# Patient Record
Sex: MALE | Employment: OTHER | URBAN - METROPOLITAN AREA
[De-identification: names, ages, dates, MRNs, and addresses within clinical notes are randomized per-mention and may not be internally consistent; named-entity substitution may affect disease eponyms.]

---

## 2023-10-28 ENCOUNTER — HOSPITAL ENCOUNTER (OUTPATIENT)
Dept: RADIOLOGY | Facility: CLINIC | Age: 68
Discharge: HOME | End: 2023-10-28
Payer: MEDICARE

## 2023-10-28 ENCOUNTER — OFFICE VISIT (OUTPATIENT)
Dept: URGENT CARE | Facility: CLINIC | Age: 68
End: 2023-10-28
Payer: MEDICARE

## 2023-10-28 VITALS
DIASTOLIC BLOOD PRESSURE: 104 MMHG | HEART RATE: 92 BPM | TEMPERATURE: 99.1 F | RESPIRATION RATE: 18 BRPM | OXYGEN SATURATION: 92 % | SYSTOLIC BLOOD PRESSURE: 179 MMHG

## 2023-10-28 DIAGNOSIS — J45.21 MILD INTERMITTENT ASTHMA WITH ACUTE EXACERBATION (HHS-HCC): Primary | ICD-10-CM

## 2023-10-28 PROCEDURE — 99214 OFFICE O/P EST MOD 30 MIN: CPT | Performed by: PHYSICIAN ASSISTANT

## 2023-10-28 PROCEDURE — 71046 X-RAY EXAM CHEST 2 VIEWS: CPT

## 2023-10-28 PROCEDURE — 94640 AIRWAY INHALATION TREATMENT: CPT | Performed by: PHYSICIAN ASSISTANT

## 2023-10-28 RX ORDER — IPRATROPIUM BROMIDE AND ALBUTEROL SULFATE 2.5; .5 MG/3ML; MG/3ML
3 SOLUTION RESPIRATORY (INHALATION) ONCE
Status: COMPLETED | OUTPATIENT
Start: 2023-10-28 | End: 2023-10-28

## 2023-10-28 RX ORDER — ALBUTEROL SULFATE 90 UG/1
2 AEROSOL, METERED RESPIRATORY (INHALATION) EVERY 6 HOURS PRN
Qty: 18 G | Refills: 0 | Status: SHIPPED | OUTPATIENT
Start: 2023-10-28 | End: 2024-10-27

## 2023-10-28 RX ORDER — DOXYCYCLINE 100 MG/1
100 CAPSULE ORAL 2 TIMES DAILY
Qty: 20 CAPSULE | Refills: 0 | Status: SHIPPED | OUTPATIENT
Start: 2023-10-28 | End: 2023-11-07

## 2023-10-28 RX ORDER — PREDNISONE 10 MG/1
40 TABLET ORAL DAILY
Qty: 20 TABLET | Refills: 0 | Status: SHIPPED | OUTPATIENT
Start: 2023-10-28 | End: 2023-11-02

## 2023-10-28 RX ADMIN — IPRATROPIUM BROMIDE AND ALBUTEROL SULFATE 3 ML: 2.5; .5 SOLUTION RESPIRATORY (INHALATION) at 10:43

## 2023-10-28 ASSESSMENT — ENCOUNTER SYMPTOMS
ENDOCRINE NEGATIVE: 1
BACK PAIN: 0
ACTIVITY CHANGE: 0
SORE THROAT: 0
EYE DISCHARGE: 0
BLOOD IN STOOL: 0
PSYCHIATRIC NEGATIVE: 1
COLOR CHANGE: 0
HEMATOLOGIC/LYMPHATIC NEGATIVE: 1
ARTHRALGIAS: 0
NAUSEA: 0
RHINORRHEA: 1
ALLERGIC/IMMUNOLOGIC NEGATIVE: 1
FEVER: 0
DYSURIA: 0
EYE REDNESS: 0
ABDOMINAL PAIN: 0
APPETITE CHANGE: 0
SHORTNESS OF BREATH: 1
FLANK PAIN: 0
DIARRHEA: 0
EYE PAIN: 0
VOMITING: 0
COUGH: 1
WOUND: 0
FATIGUE: 0
SINUS PRESSURE: 1
NEUROLOGICAL NEGATIVE: 1
WHEEZING: 1

## 2023-10-28 NOTE — PATIENT INSTRUCTIONS
Mild intermittent asthma with acute exacerbation - Primary    Relevant Medications    predniSONE (Deltasone) tablet 60 mg    ipratropium-albuteroL (Duo-Neb) 0.5-2.5 mg/3 mL nebulizer solution 3 mL    Other Relevant Orders    XR chest 2 views      Narrative & Impression   Interpreted By:  Mady Pascal,   STUDY:  XR CHEST 2 VIEWS;  10/28/2023 10:35 am      INDICATION:  Signs/Symptoms:asthma flare.      COMPARISON:  None.      ACCESSION NUMBER(S):  [id number]      ORDERING CLINICIAN:  OPAL BRISCOE      FINDINGS:                  CARDIOMEDIASTINAL SILHOUETTE:  Cardiomediastinal silhouette is normal in size and configuration.      LUNGS:  On the right there is an oval density projecting at the level of the  1st rib cartilage. From this single study is difficult to determine  whether this represents calcification in costal cartilage or a  pulmonary nodule. The former is favored. Lungs otherwise are clear.  No pleural effusion is present.      ABDOMEN:  No remarkable upper abdominal findings.      BONES:  No acute osseous changes.      IMPRESSION:  1.  Density on the right is equivocal between 1st costal cartilage  calcification versus pulmonary nodule. Oblique rib views would  clarify.  2. No focal pneumonia is noted.       Recommending follow-up with primary care concerning the possibility of pulmonary nodule.  Typically this is actually evaluated with low-dose CT which is much more sensitive for pulmonary nodules.    Otherwise the patient appears to have acute exacerbation of underlying chronic lung disease  Treating with prednisone over the course the next 5 days, albuterol, and covering for atypicals with doxycycline given the longevity of the patient's complaints.

## 2023-10-28 NOTE — PROGRESS NOTES
Subjective   Patient ID: Adolph Pedraza is a 68 y.o. male who presents for Cough and Nasal Congestion.  Patient notes 2 weeks of cough now.  He does report an underlying history of asthma.  He denies any significant production with the cough but does note that the cough is painful.  He endorses shortness of breath.  He has been using his albuterol inhaler without resolution of his symptoms.  He denies any new onset fevers denies myalgia denies nausea vomiting diarrhea or abdominal pain.  He does endorse some fatigue.  Denies any lower extremity swelling.    Past Medical History:   Diagnosis Date    Personal history of other diseases of the circulatory system 03/04/2022    History of hypertension    Personal history of other diseases of the digestive system 03/04/2022    History of Nieves's esophagus    Personal history of other diseases of the respiratory system 03/04/2022    History of asthma    Personal history of other endocrine, nutritional and metabolic disease 03/04/2022    History of hyperlipidemia       Review of Systems   Constitutional:  Negative for activity change, appetite change, fatigue and fever.   HENT:  Positive for congestion, rhinorrhea and sinus pressure. Negative for sore throat.    Eyes:  Negative for pain, discharge and redness.   Respiratory:  Positive for cough, shortness of breath and wheezing.    Cardiovascular:  Negative for chest pain and leg swelling.   Gastrointestinal:  Negative for abdominal pain, blood in stool, diarrhea, nausea and vomiting.   Endocrine: Negative.    Genitourinary:  Negative for dysuria and flank pain.   Musculoskeletal:  Negative for arthralgias, back pain and gait problem.   Skin:  Negative for color change, rash and wound.   Allergic/Immunologic: Negative.    Neurological: Negative.    Hematological: Negative.    Psychiatric/Behavioral: Negative.         Objective   BP (!) 179/104   Pulse 92   Temp 37.3 °C (99.1 °F)   Resp 18   SpO2 92%   Physical  Exam  Constitutional:       General: He is not in acute distress.     Appearance: Normal appearance. He is not ill-appearing, toxic-appearing or diaphoretic.   HENT:      Head: Normocephalic and atraumatic.      Mouth/Throat:      Mouth: Mucous membranes are moist.      Pharynx: Oropharynx is clear.   Eyes:      Conjunctiva/sclera: Conjunctivae normal.   Cardiovascular:      Rate and Rhythm: Normal rate and regular rhythm.      Heart sounds: No murmur heard.  Pulmonary:      Effort: Pulmonary effort is normal. No respiratory distress.      Breath sounds: Wheezing and rhonchi present. No rales.   Chest:      Chest wall: No tenderness.   Musculoskeletal:         General: No swelling, tenderness, deformity or signs of injury. Normal range of motion.      Cervical back: Normal range of motion and neck supple.   Skin:     General: Skin is warm and dry.      Findings: No erythema or rash.   Neurological:      General: No focal deficit present.      Mental Status: He is alert and oriented to person, place, and time.      Gait: Gait normal.       Assessment/Plan   Problem List Items Addressed This Visit       Mild intermittent asthma with acute exacerbation - Primary    Relevant Medications    predniSONE (Deltasone) tablet 60 mg    ipratropium-albuteroL (Duo-Neb) 0.5-2.5 mg/3 mL nebulizer solution 3 mL    Other Relevant Orders    XR chest 2 views      Narrative & Impression   Interpreted By:  Mady Pascal,   STUDY:  XR CHEST 2 VIEWS;  10/28/2023 10:35 am      INDICATION:  Signs/Symptoms:asthma flare.      COMPARISON:  None.      ACCESSION NUMBER(S):  [id number]      ORDERING CLINICIAN:  OPAL BRISCOE      FINDINGS:                  CARDIOMEDIASTINAL SILHOUETTE:  Cardiomediastinal silhouette is normal in size and configuration.      LUNGS:  On the right there is an oval density projecting at the level of the  1st rib cartilage. From this single study is difficult to determine  whether this represents calcification in  costal cartilage or a  pulmonary nodule. The former is favored. Lungs otherwise are clear.  No pleural effusion is present.      ABDOMEN:  No remarkable upper abdominal findings.      BONES:  No acute osseous changes.      IMPRESSION:  1.  Density on the right is equivocal between 1st costal cartilage  calcification versus pulmonary nodule. Oblique rib views would  clarify.  2. No focal pneumonia is noted.       Recommending follow-up with primary care concerning the possibility of pulmonary nodule.  Typically this is actually evaluated with low-dose CT which is much more sensitive for pulmonary nodules.    Otherwise the patient appears to have acute exacerbation of underlying chronic lung disease  Treating with prednisone over the course the next 5 days, albuterol, and covering for atypicals with doxycycline given the longevity of the patient's complaints.